# Patient Record
Sex: MALE | Race: WHITE | NOT HISPANIC OR LATINO | Employment: FULL TIME | URBAN - METROPOLITAN AREA
[De-identification: names, ages, dates, MRNs, and addresses within clinical notes are randomized per-mention and may not be internally consistent; named-entity substitution may affect disease eponyms.]

---

## 2018-02-20 ENCOUNTER — OFFICE VISIT (OUTPATIENT)
Dept: INTERNAL MEDICINE CLINIC | Facility: CLINIC | Age: 23
End: 2018-02-20
Payer: COMMERCIAL

## 2018-02-20 VITALS
SYSTOLIC BLOOD PRESSURE: 124 MMHG | OXYGEN SATURATION: 98 % | TEMPERATURE: 98.2 F | BODY MASS INDEX: 31.61 KG/M2 | DIASTOLIC BLOOD PRESSURE: 82 MMHG | HEART RATE: 94 BPM | HEIGHT: 70 IN | WEIGHT: 220.8 LBS

## 2018-02-20 DIAGNOSIS — Z13.1 ENCOUNTER FOR SCREENING EXAMINATION FOR IMPAIRED GLUCOSE REGULATION AND DIABETES MELLITUS: ICD-10-CM

## 2018-02-20 DIAGNOSIS — Z77.21 HISTORY OF EXPOSURE TO HAZARDOUS BODILY FLUIDS: ICD-10-CM

## 2018-02-20 DIAGNOSIS — Z13.220 SCREENING, LIPID: Primary | ICD-10-CM

## 2018-02-20 PROCEDURE — 99213 OFFICE O/P EST LOW 20 MIN: CPT | Performed by: PHYSICIAN ASSISTANT

## 2018-02-20 NOTE — PROGRESS NOTES
Assessment/Plan:    Screening, lipid   No recent cholesterol screening  Due to age will check lipids with ordered panel as discussed below  Encouraged low-fat low-cholesterol diet which antibody can benefit from    History of exposure to hazardous bodily fluids   Previous sexual exposure with a partner that may have HSV 2  Patient himself does get intermittent cold sores but has for some time and was unrelated to this previous exposure  Has since recently started with new partner wanted to be tested  Will send for STI panel  It should be noted he does have a history of previous cold sores and I noted to him he may come back positive for IgG  Discussed prophylaxis medications which she wants to hold on at this time  Discussed the importance of safe sex practices and condom use to prevent the risk of STD  Discussed that a hsv can be transmitted even when cold sores or genital lesions are not present  Patient currently without any symptoms or lesions  Encounter for screening examination for impaired glucose regulation and diabetes mellitus    Will check screening fasting CMP to evaluate glucose and renal function       Diagnoses and all orders for this visit:    Screening, lipid  -     Comprehensive metabolic panel; Future  -     Lipid panel; Future    History of exposure to hazardous bodily fluids  -     Chlamydia/GC amplified DNA by PCR; Future  -     Hepatitis B surface antigen; Future  -     Hepatitis C antibody; Future  -     RPR; Future  -     HSV 1/2 Ab (IgM), IFA W/Rfl To Titer; Future  -     HSV Type 1-Specific Ab, IgG; Future  -     HSV Type 2-Specific Ab, IgG; Future    Encounter for screening examination for impaired glucose regulation and diabetes mellitus  -     Comprehensive metabolic panel; Future  -     Lipid panel; Future          Subjective:          Patient ID: Clyde Forte is a 21 y o  male  40-year-old male presents to the office for evaluation    Notes his  Previous partner with whom he was sexually active was H as the 2 positive and should be tested  She was treated with valacyclovir  He himself is without any current symptoms but has since became sexually active with a new partner and he wants to get tested to make sure he is not at a risk to himself or risk of exposing his new partner  Himself never was positive for STD but wants to be tested  Penis Pain   The patient's pertinent negatives include no genital injury, genital itching, genital lesions, penile discharge, penile pain, scrotal swelling or testicular pain  The patient is experiencing no pain  Pertinent negatives include no abdominal pain, chest pain, constipation, coughing, diarrhea, dysuria, fever, frequency, nausea, rash, shortness of breath, sore throat, urgency or vomiting  The following portions of the patient's history were reviewed and updated as appropriate: allergies, current medications, past family history, past medical history, past social history, past surgical history and problem list     Review of Systems   Constitutional: Negative for fever  HENT: Negative for sore throat  Eyes: Negative for visual disturbance  Hx of corneal abrasion some time ago  Full resolved  Respiratory: Negative for cough, shortness of breath and wheezing  Cardiovascular: Negative for chest pain and palpitations  Gastrointestinal: Negative for abdominal pain, blood in stool, constipation, diarrhea, nausea and vomiting  Genitourinary: Negative for discharge, dysuria, frequency, genital sores, penile pain, scrotal swelling, testicular pain and urgency  Musculoskeletal:        Chronic joint muscle aches, works construction  Skin: Negative for rash  Psychiatric/Behavioral: Negative for agitation  History reviewed  No pertinent past medical history  No current outpatient prescriptions on file      No Known Allergies    Social History   Past Surgical History:   Procedure Laterality Date    MOUTH SURGERY Bilateral      Family History   Problem Relation Age of Onset    No Known Problems Mother     No Known Problems Father        Objective:  /82 (BP Location: Left arm, Patient Position: Sitting, Cuff Size: Large)   Pulse 94   Temp 98 2 °F (36 8 °C) (Oral)   Ht 5' 10" (1 778 m)   Wt 100 kg (220 lb 12 8 oz)   SpO2 98%   BMI 31 68 kg/m²   Body mass index is 31 68 kg/m²  Physical Exam   Constitutional: He appears well-developed and well-nourished  No distress  HENT:   Head: Normocephalic and atraumatic  Mouth/Throat: Oropharynx is clear and moist    NO perioral cold sores   Eyes: Pupils are equal, round, and reactive to light  Right eye exhibits no discharge  Left eye exhibits no discharge  No scleral icterus  Neck: Neck supple  Cardiovascular: Normal rate and regular rhythm  Pulmonary/Chest: Effort normal and breath sounds normal  No respiratory distress  He has no wheezes  CTA throughout  Abdominal: Soft  Bowel sounds are normal  There is no tenderness  There is no guarding  Genitourinary: No penile tenderness  Genitourinary Comments: No penis rash, no penile drainage  Musculoskeletal: He exhibits no edema  Neurological: He is alert  No gross focal neuro deficits  Skin: Skin is warm and dry  No rash noted  He is not diaphoretic  No penis drainage  No penile rash  No testicular TTP  No hernia  Psychiatric: He has a normal mood and affect  His behavior is normal  Thought content normal    Nursing note and vitals reviewed

## 2018-02-20 NOTE — PATIENT INSTRUCTIONS
Screening, lipid   No recent cholesterol screening  Due to age will check lipids with ordered panel as discussed below  Encouraged low-fat low-cholesterol diet which antibody can benefit from    History of exposure to hazardous bodily fluids   Previous sexual exposure with a partner that may have HSV 2  Patient himself does get intermittent cold sores but has for some time and was unrelated to this previous exposure  Has since recently started with new partner wanted to be tested  Will send for STI panel  It should be noted he does have a history of previous cold sores and I noted to him he may come back positive for IgG  Discussed prophylaxis medications which she wants to hold on at this time  Discussed the importance of safe sex practices and condom use to prevent the risk of STD  Discussed that a hsv can be transmitted even when cold sores or genital lesions are not present  Patient currently without any symptoms or lesions      Encounter for screening examination for impaired glucose regulation and diabetes mellitus    Will check screening fasting CMP to evaluate glucose and renal function

## 2018-02-20 NOTE — ASSESSMENT & PLAN NOTE
Previous sexual exposure with a partner that may have HSV 2  Patient himself does get intermittent cold sores but has for some time and was unrelated to this previous exposure  Has since recently started with new partner wanted to be tested  Will send for STI panel  It should be noted he does have a history of previous cold sores and I noted to him he may come back positive for IgG  Discussed prophylaxis medications which she wants to hold on at this time  Discussed the importance of safe sex practices and condom use to prevent the risk of STD  Discussed that a hsv can be transmitted even when cold sores or genital lesions are not present  Patient currently without any symptoms or lesions

## 2018-02-20 NOTE — ASSESSMENT & PLAN NOTE
No recent cholesterol screening  Due to age will check lipids with ordered panel as discussed below    Encouraged low-fat low-cholesterol diet which antibody can benefit from

## 2018-03-06 NOTE — PROGRESS NOTES
Could we please check to see that we get HSV results, still pending on this lab    Thanks Tayler Mart

## 2018-03-09 ENCOUNTER — TELEPHONE (OUTPATIENT)
Dept: INTERNAL MEDICINE CLINIC | Facility: CLINIC | Age: 23
End: 2018-03-09

## 2018-03-09 DIAGNOSIS — Z77.21 HISTORY OF EXPOSURE TO HAZARDOUS BODILY FLUIDS: Primary | ICD-10-CM

## 2018-03-09 PROBLEM — R89.4 POSITIVE TEST FOR HERPES SIMPLEX VIRUS (HSV) ANTIBODY: Status: ACTIVE | Noted: 2018-03-09

## 2018-03-12 ENCOUNTER — TELEPHONE (OUTPATIENT)
Dept: INTERNAL MEDICINE CLINIC | Facility: CLINIC | Age: 23
End: 2018-03-12

## 2018-03-12 NOTE — TELEPHONE ENCOUNTER
Called pt  Mailbox is full and not enough room to leave message  Will attempt to call patient again

## 2018-03-13 NOTE — TELEPHONE ENCOUNTER
Informed pt of message  Mailed script for HIV to house per pt request   Phone number was incorrect in chart    Updated demographics

## 2018-04-25 LAB
C TRACH RRNA SPEC QL PROBE: NOT DETECTED
N GONORRHOEA RRNA SPEC QL PROBE: NOT DETECTED

## 2018-10-01 ENCOUNTER — OFFICE VISIT (OUTPATIENT)
Dept: INTERNAL MEDICINE CLINIC | Facility: CLINIC | Age: 23
End: 2018-10-01
Payer: COMMERCIAL

## 2018-10-01 VITALS
DIASTOLIC BLOOD PRESSURE: 72 MMHG | WEIGHT: 220 LBS | RESPIRATION RATE: 18 BRPM | HEIGHT: 70 IN | SYSTOLIC BLOOD PRESSURE: 116 MMHG | HEART RATE: 68 BPM | BODY MASS INDEX: 31.5 KG/M2

## 2018-10-01 DIAGNOSIS — Z20.2 EXPOSURE TO STD: Primary | ICD-10-CM

## 2018-10-01 PROCEDURE — 87389 HIV-1 AG W/HIV-1&-2 AB AG IA: CPT | Performed by: NURSE PRACTITIONER

## 2018-10-01 PROCEDURE — 86696 HERPES SIMPLEX TYPE 2 TEST: CPT | Performed by: NURSE PRACTITIONER

## 2018-10-01 PROCEDURE — 87491 CHLMYD TRACH DNA AMP PROBE: CPT | Performed by: NURSE PRACTITIONER

## 2018-10-01 PROCEDURE — 86695 HERPES SIMPLEX TYPE 1 TEST: CPT | Performed by: NURSE PRACTITIONER

## 2018-10-01 PROCEDURE — 36415 COLL VENOUS BLD VENIPUNCTURE: CPT | Performed by: NURSE PRACTITIONER

## 2018-10-01 PROCEDURE — 87591 N.GONORRHOEAE DNA AMP PROB: CPT | Performed by: NURSE PRACTITIONER

## 2018-10-01 PROCEDURE — 86592 SYPHILIS TEST NON-TREP QUAL: CPT | Performed by: NURSE PRACTITIONER

## 2018-10-01 PROCEDURE — 99213 OFFICE O/P EST LOW 20 MIN: CPT | Performed by: NURSE PRACTITIONER

## 2018-10-01 RX ORDER — AZITHROMYCIN 500 MG/1
1000 TABLET, FILM COATED ORAL ONCE
Qty: 2 TABLET | Refills: 0 | Status: SHIPPED | OUTPATIENT
Start: 2018-10-01 | End: 2018-10-01

## 2018-10-01 RX ORDER — CEFTRIAXONE SODIUM 250 MG/1
250 INJECTION, POWDER, FOR SOLUTION INTRAMUSCULAR; INTRAVENOUS ONCE
Status: COMPLETED | OUTPATIENT
Start: 2018-10-01 | End: 2018-10-01

## 2018-10-01 RX ADMIN — CEFTRIAXONE SODIUM 250 MG: 250 INJECTION, POWDER, FOR SOLUTION INTRAMUSCULAR; INTRAVENOUS at 07:59

## 2018-10-01 NOTE — PATIENT INSTRUCTIONS
Will test for all STDs today  Treated empirically for GC/Chlamydia with IM rocephin and po azithromycin  Will check test of cure in 3 weeks  Patient educated to use condoms until test of cure is clear  Educated about safe-sexual practices   Return for for health maintenance exam

## 2018-10-01 NOTE — PROGRESS NOTES
Assessment/Plan:    No problem-specific Assessment & Plan notes found for this encounter  Diagnoses and all orders for this visit:    Exposure to STD  -     Chlamydia/GC amplified DNA by PCR  -     HIV 1/2 AG-AB combo; Future  -     Herpes I/II IgG Antibodies; Future  -     RPR; Future  -     HIV 1/2 AG-AB combo  -     Herpes I/II IgG Antibodies  -     RPR  -     cefTRIAXone (ROCEPHIN) injection 250 mg; Inject 250 mg into a muscle once   -     azithromycin (ZITHROMAX) 500 MG tablet; Take 2 tablets (1,000 mg total) by mouth once for 1 dose  -     Chlamydia/GC amplified DNA by PCR; Future      Will test for all STDs today  Treated empirically for GC/Chlamydia with IM rocephin and po azithromycin  Will check test of cure in 3 weeks  Patient educated to use condoms until test of cure is clear  Educated about safe-sexual practices  Return for for health maintenance exam      Subjective:      Patient ID: Larry Hurst is a 21 y o  male  Patient presents for an acute visit  He reports that an ex-girlfriend informed him last week that she tested positive for chlamydia  He would like to have a full panel of STD testing  He denies burning on urination, penile discharge, testicular pain, rash, penile lesions, or other symptoms  Exposure to STD   The patient's pertinent negatives include no penile discharge, penile pain, scrotal swelling or testicular pain  Primary symptoms comment: Denies  This is a new problem  Episode onset: Unclear  The patient is experiencing no pain  Pertinent negatives include no abdominal pain, chest pain, chills, constipation, diarrhea, dysuria, fever, flank pain, frequency, headaches, hematuria, hesitancy, nausea, painful intercourse, rash, shortness of breath, sore throat, urgency or vomiting  Nothing aggravates the symptoms  He has tried nothing for the symptoms  He is sexually active  He inconsistently uses condoms   Yes (former partner tested postive for chlamydia), his partner has an STD  There is no history of kidney stones  The following portions of the patient's history were reviewed and updated as appropriate: allergies, current medications, past family history, past medical history, past social history, past surgical history and problem list     Review of Systems   Constitutional: Negative for chills and fever  HENT: Negative for sore throat  Respiratory: Negative for shortness of breath  Cardiovascular: Negative for chest pain  Gastrointestinal: Negative for abdominal pain, constipation, diarrhea, nausea and vomiting  Genitourinary: Negative for decreased urine volume, discharge, dysuria, flank pain, frequency, genital sores, hesitancy, penile pain, penile swelling, scrotal swelling, testicular pain and urgency  Skin: Negative for rash  Neurological: Negative for dizziness and headaches  Psychiatric/Behavioral: The patient is not nervous/anxious  History reviewed  No pertinent past medical history  Current Outpatient Prescriptions:     azithromycin (ZITHROMAX) 500 MG tablet, Take 2 tablets (1,000 mg total) by mouth once for 1 dose, Disp: 2 tablet, Rfl: 0  No current facility-administered medications for this visit  No Known Allergies    Social History   Past Surgical History:   Procedure Laterality Date    MOUTH SURGERY Bilateral      Family History   Problem Relation Age of Onset    No Known Problems Mother     No Known Problems Father        Objective:  /72   Pulse 68   Resp 18   Ht 5' 10" (1 778 m)   Wt 99 8 kg (220 lb)   BMI 31 57 kg/m²        Physical Exam   Constitutional: He is oriented to person, place, and time  He appears well-developed and well-nourished  No distress  HENT:   Head: Normocephalic and atraumatic  Eyes: Pupils are equal, round, and reactive to light  Conjunctivae are normal    Neck: Normal range of motion  Neck supple  No thyromegaly present     Cardiovascular: Normal rate, regular rhythm and normal heart sounds  Pulmonary/Chest: Breath sounds normal  No respiratory distress  Abdominal: Soft  Bowel sounds are normal    Musculoskeletal: Normal range of motion  Neurological: He is alert and oriented to person, place, and time  Skin: Skin is warm and dry  Psychiatric: He has a normal mood and affect   His behavior is normal  Judgment and thought content normal

## 2018-10-02 LAB
CHLAMYDIA DNA CVX QL NAA+PROBE: ABNORMAL
N GONORRHOEA DNA GENITAL QL NAA+PROBE: ABNORMAL
RPR SER QL: NORMAL

## 2018-10-04 ENCOUNTER — TELEPHONE (OUTPATIENT)
Dept: INTERNAL MEDICINE CLINIC | Facility: CLINIC | Age: 23
End: 2018-10-04

## 2018-10-04 LAB — HIV 1+2 AB+HIV1 P24 AG SERPL QL IA: NORMAL

## 2018-10-04 NOTE — TELEPHONE ENCOUNTER
I called and discussed patient's positive Chlamydia test result with him  He said that he did get the ceftriaxone 250 mg IM injection and the 1G of Azithromycin  Pt states that he has no symptoms at this time and I informed him that the medications he took should take care of the infection but that he should call the office if he develops symptoms at any time  Of note, his girlfriend was already treated  He denies being sexually involved with any other partner

## 2018-11-05 ENCOUNTER — OFFICE VISIT (OUTPATIENT)
Dept: INTERNAL MEDICINE CLINIC | Facility: CLINIC | Age: 23
End: 2018-11-05
Payer: COMMERCIAL

## 2018-11-05 VITALS
HEIGHT: 71 IN | BODY MASS INDEX: 29.34 KG/M2 | TEMPERATURE: 97.7 F | OXYGEN SATURATION: 98 % | DIASTOLIC BLOOD PRESSURE: 60 MMHG | SYSTOLIC BLOOD PRESSURE: 102 MMHG | WEIGHT: 209.6 LBS | HEART RATE: 73 BPM

## 2018-11-05 DIAGNOSIS — Z13.220 SCREENING, LIPID: ICD-10-CM

## 2018-11-05 DIAGNOSIS — Z13.1 ENCOUNTER FOR SCREENING EXAMINATION FOR IMPAIRED GLUCOSE REGULATION AND DIABETES MELLITUS: ICD-10-CM

## 2018-11-05 DIAGNOSIS — Z20.2 EXPOSURE TO STD: ICD-10-CM

## 2018-11-05 DIAGNOSIS — Z00.00 HEALTHCARE MAINTENANCE: Primary | ICD-10-CM

## 2018-11-05 LAB
ALBUMIN SERPL BCP-MCNC: 4.3 G/DL (ref 3.5–5)
ALP SERPL-CCNC: 92 U/L (ref 46–116)
ALT SERPL W P-5'-P-CCNC: 57 U/L (ref 12–78)
ANION GAP SERPL CALCULATED.3IONS-SCNC: 6 MMOL/L (ref 4–13)
AST SERPL W P-5'-P-CCNC: 28 U/L (ref 5–45)
BILIRUB SERPL-MCNC: 0.85 MG/DL (ref 0.2–1)
BUN SERPL-MCNC: 17 MG/DL (ref 5–25)
CALCIUM SERPL-MCNC: 9.3 MG/DL (ref 8.3–10.1)
CHLORIDE SERPL-SCNC: 104 MMOL/L (ref 100–108)
CHOLEST SERPL-MCNC: 168 MG/DL (ref 50–200)
CO2 SERPL-SCNC: 28 MMOL/L (ref 21–32)
CREAT SERPL-MCNC: 0.85 MG/DL (ref 0.6–1.3)
GFR SERPL CREATININE-BSD FRML MDRD: 123 ML/MIN/1.73SQ M
GLUCOSE P FAST SERPL-MCNC: 91 MG/DL (ref 65–99)
HDLC SERPL-MCNC: 38 MG/DL (ref 40–60)
LDLC SERPL CALC-MCNC: 107 MG/DL (ref 0–100)
NONHDLC SERPL-MCNC: 130 MG/DL
POTASSIUM SERPL-SCNC: 4.6 MMOL/L (ref 3.5–5.3)
PROT SERPL-MCNC: 7.6 G/DL (ref 6.4–8.2)
SODIUM SERPL-SCNC: 138 MMOL/L (ref 136–145)
TRIGL SERPL-MCNC: 116 MG/DL

## 2018-11-05 PROCEDURE — 87591 N.GONORRHOEAE DNA AMP PROB: CPT | Performed by: NURSE PRACTITIONER

## 2018-11-05 PROCEDURE — 87491 CHLMYD TRACH DNA AMP PROBE: CPT | Performed by: NURSE PRACTITIONER

## 2018-11-05 PROCEDURE — 80053 COMPREHEN METABOLIC PANEL: CPT | Performed by: NURSE PRACTITIONER

## 2018-11-05 PROCEDURE — 36415 COLL VENOUS BLD VENIPUNCTURE: CPT | Performed by: NURSE PRACTITIONER

## 2018-11-05 PROCEDURE — 80061 LIPID PANEL: CPT | Performed by: NURSE PRACTITIONER

## 2018-11-05 PROCEDURE — 99395 PREV VISIT EST AGE 18-39: CPT | Performed by: NURSE PRACTITIONER

## 2018-11-05 NOTE — PROGRESS NOTES
Assessment/Plan:     Diagnoses and all orders for this visit:    Healthcare maintenance      Patient to have previously ordered labs completed today after his visit  Discussed with him discontinuing using chewing tobacco, including the risks of nicotine use  Discussed testicular self examinations and healthy diet  States sexual practices discussed with patient, including barrier methods of contraception  The patient will have a test of cure urine test post treatment for chlamydia today along with his lab work  Patient to follow up in 6 months or sooner as needed  Subjective:      Patient ID: Yvette Salguero is a 21 y o  male and presents today for Health Maintenance Physical     Last Physical: DOT physical 3/27/2018    Pt reports overall health:  Good     Healthy Diet:  Patient sometimes eats fast food, eats at restaurants often  He reports that he primarily eats red meat  Routine Exercise: He works construction daily, 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  [facility] Concerns:  Denies    Problems with vision:  Denies vision problems, although 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   Last Eye Exam:  With DOT physical    Problems with Hearing:  Denies, tries to wear proper hearing protection    Routine Dental Exams:  Goes twice yearly    Smoking History:  Denies smoking, but chews tobacco    ETOH Use:  Drinks a case of beer about every 2 weeks, but this varies  Illegal Drug Use:  Denies  Caffeine Use:  20 oz can of Sugar Free  Red Bull per day  Last PSA:  N/a     Testicular self exam:  Denies     Last Colonoscopy:  Denies  History of abnormal Colonoscopy:  N/a  Family History of Colon CA:  Denies    Last Dexa:  Denies    Immunizations:  Patient reports that he had childhood vaccines  TDAP 9/3/2015  Declines Flu shot    Last Labs:  STD panel within 2018, no previous labs in chart       The following portions of the patient's history were reviewed and updated as appropriate: allergies, current medications, past family history, past medical history, past social history, past surgical history and problem list     Review of Systems   Constitutional: Negative for appetite change, chills, diaphoresis, fatigue, fever and unexpected weight change  HENT: Negative for dental problem, ear pain, hearing loss, tinnitus, trouble swallowing and voice change  Eyes: Negative for pain and visual disturbance  Respiratory: Negative for cough, chest tightness, shortness of breath and wheezing  Cardiovascular: Negative for chest pain, palpitations and leg swelling  Gastrointestinal: Negative for abdominal pain, anal bleeding, blood in stool, constipation, diarrhea, nausea, rectal pain and vomiting  Endocrine: Negative for cold intolerance, heat intolerance, polydipsia, polyphagia and polyuria  Genitourinary: Negative for discharge, dysuria, genital sores, scrotal swelling and testicular pain  Musculoskeletal: Positive for back pain (patient reports that it relates to what type of work he is doing, varies, is not constant, mild)  Negative for gait problem, neck pain and neck stiffness  Skin: Negative for rash and wound  Neurological: Negative for dizziness, seizures, syncope, light-headedness and headaches  Hematological: Does not bruise/bleed easily  Psychiatric/Behavioral: Negative for agitation, confusion, decreased concentration, hallucinations, self-injury, sleep disturbance and suicidal ideas  The patient is not nervous/anxious  History reviewed  No pertinent past medical history  No current outpatient prescriptions on file      No Known Allergies    Social History   Past Surgical History:   Procedure Laterality Date    MOUTH SURGERY Bilateral     WISDOM TOOTH EXTRACTION       Family History   Problem Relation Age of Onset    Areta Rose Mary Parkinson White syndrome Mother     Thyroid disease Mother     No Known Problems Father        Objective:  /60 (BP Location: Left arm, Patient Position: Sitting, Cuff Size: Large)   Pulse 73   Temp 97 7 °F (36 5 °C) (Oral)   Ht 5' 11" (1 803 m)   Wt 95 1 kg (209 lb 9 6 oz)   SpO2 98%   BMI 29 23 kg/m²      Physical Exam   Constitutional: He is oriented to person, place, and time  He appears well-developed and well-nourished  No distress  HENT:   Head: Normocephalic and atraumatic  Right Ear: External ear normal    Left Ear: External ear normal    Nose: Nose normal    Mouth/Throat: Oropharynx is clear and moist  No oropharyngeal exudate  Eyes: Pupils are equal, round, and reactive to light  Conjunctivae and EOM are normal    Neck: Normal range of motion  Neck supple  No tracheal deviation present  No thyromegaly present  Cardiovascular: Normal rate, regular rhythm, normal heart sounds and intact distal pulses  Pulmonary/Chest: Effort normal and breath sounds normal  No respiratory distress  Abdominal: Soft  Bowel sounds are normal  He exhibits no distension and no mass  There is no tenderness  Hernia confirmed negative in the right inguinal area and confirmed negative in the left inguinal area  Genitourinary: Prostate normal, testes normal and penis normal  Cremasteric reflex is present  Circumcised  No penile tenderness  Musculoskeletal: Normal range of motion  He exhibits no edema  Lymphadenopathy:     He has no cervical adenopathy  Right: No inguinal adenopathy present  Left: No inguinal adenopathy present  Neurological: He is alert and oriented to person, place, and time  Skin: Skin is warm and dry  No rash noted  He is not diaphoretic  Psychiatric: He has a normal mood and affect  His behavior is normal  Judgment and thought content normal    Vitals reviewed

## 2018-11-05 NOTE — PATIENT INSTRUCTIONS

## 2018-11-07 LAB
CHLAMYDIA DNA CVX QL NAA+PROBE: ABNORMAL
N GONORRHOEA DNA GENITAL QL NAA+PROBE: ABNORMAL

## 2018-11-12 DIAGNOSIS — A74.9 CHLAMYDIA: Primary | ICD-10-CM

## 2018-11-12 RX ORDER — AZITHROMYCIN 500 MG/1
1000 TABLET, FILM COATED ORAL ONCE
Qty: 2 TABLET | Refills: 0 | Status: SHIPPED | OUTPATIENT
Start: 2018-11-12 | End: 2018-11-12

## 2018-11-12 NOTE — PROGRESS NOTES
Patient's test of cure for chlamydia returned positive  He had been treated with 1 gram of azithromycin and ceftriaxone IM in the office  The patient reports that his partner had been treated and did a resumed sexual activity  The patient was advised to have his partner re-treated, as well  Abstain for 1 week following treatment  Retest for cure in 3 weeks

## 2019-05-06 ENCOUNTER — OFFICE VISIT (OUTPATIENT)
Dept: INTERNAL MEDICINE CLINIC | Facility: CLINIC | Age: 24
End: 2019-05-06
Payer: COMMERCIAL

## 2019-05-06 VITALS
BODY MASS INDEX: 30.67 KG/M2 | DIASTOLIC BLOOD PRESSURE: 80 MMHG | SYSTOLIC BLOOD PRESSURE: 112 MMHG | HEART RATE: 70 BPM | OXYGEN SATURATION: 98 % | WEIGHT: 214.2 LBS | TEMPERATURE: 97.7 F | HEIGHT: 70 IN

## 2019-05-06 DIAGNOSIS — Z20.2 CHLAMYDIA CONTACT, TREATED: Primary | ICD-10-CM

## 2019-05-06 DIAGNOSIS — E66.9 OBESITY (BMI 30-39.9): ICD-10-CM

## 2019-05-06 LAB
C TRACH DNA SPEC QL NAA+PROBE: NEGATIVE
N GONORRHOEA DNA SPEC QL NAA+PROBE: NEGATIVE

## 2019-05-06 PROCEDURE — 99213 OFFICE O/P EST LOW 20 MIN: CPT | Performed by: NURSE PRACTITIONER

## 2019-05-06 PROCEDURE — 1036F TOBACCO NON-USER: CPT | Performed by: NURSE PRACTITIONER

## 2019-05-06 PROCEDURE — 3008F BODY MASS INDEX DOCD: CPT | Performed by: NURSE PRACTITIONER

## 2019-05-06 PROCEDURE — 87491 CHLMYD TRACH DNA AMP PROBE: CPT | Performed by: NURSE PRACTITIONER

## 2019-05-06 PROCEDURE — 87591 N.GONORRHOEAE DNA AMP PROB: CPT | Performed by: NURSE PRACTITIONER

## 2019-05-07 LAB — HSV1 IGG SER IA-ACNC: NORMAL

## 2020-04-16 ENCOUNTER — TELEPHONE (OUTPATIENT)
Dept: INTERNAL MEDICINE CLINIC | Facility: CLINIC | Age: 25
End: 2020-04-16

## 2020-08-13 ENCOUNTER — TELEPHONE (OUTPATIENT)
Dept: INTERNAL MEDICINE CLINIC | Facility: CLINIC | Age: 25
End: 2020-08-13

## 2020-08-13 NOTE — TELEPHONE ENCOUNTER
LMOM to patient to call back the office      Pt is overdue for physical exam  Attending Attestation (For Attendings USE Only)...

## 2020-10-13 ENCOUNTER — OFFICE VISIT (OUTPATIENT)
Dept: INTERNAL MEDICINE CLINIC | Facility: CLINIC | Age: 25
End: 2020-10-13
Payer: COMMERCIAL

## 2020-10-13 VITALS
BODY MASS INDEX: 33.23 KG/M2 | HEART RATE: 89 BPM | HEIGHT: 71 IN | DIASTOLIC BLOOD PRESSURE: 80 MMHG | OXYGEN SATURATION: 98 % | WEIGHT: 237.4 LBS | SYSTOLIC BLOOD PRESSURE: 130 MMHG | TEMPERATURE: 98.4 F

## 2020-10-13 DIAGNOSIS — Z13.220 SCREENING, LIPID: ICD-10-CM

## 2020-10-13 DIAGNOSIS — Z28.21 REFUSED INFLUENZA VACCINE: ICD-10-CM

## 2020-10-13 DIAGNOSIS — Z00.00 PHYSICAL EXAM, ANNUAL: Primary | ICD-10-CM

## 2020-10-13 PROCEDURE — 3725F SCREEN DEPRESSION PERFORMED: CPT | Performed by: NURSE PRACTITIONER

## 2020-10-13 PROCEDURE — 1036F TOBACCO NON-USER: CPT | Performed by: NURSE PRACTITIONER

## 2020-10-13 PROCEDURE — 99395 PREV VISIT EST AGE 18-39: CPT | Performed by: NURSE PRACTITIONER

## 2021-03-19 ENCOUNTER — OFFICE VISIT (OUTPATIENT)
Dept: URGENT CARE | Age: 26
End: 2021-03-19
Payer: COMMERCIAL

## 2021-03-19 VITALS
OXYGEN SATURATION: 99 % | WEIGHT: 210 LBS | TEMPERATURE: 96.6 F | RESPIRATION RATE: 16 BRPM | BODY MASS INDEX: 29.4 KG/M2 | HEIGHT: 71 IN | HEART RATE: 72 BPM

## 2021-03-19 DIAGNOSIS — B34.9 ACUTE VIRAL SYNDROME: Primary | ICD-10-CM

## 2021-03-19 PROCEDURE — 99213 OFFICE O/P EST LOW 20 MIN: CPT | Performed by: PHYSICIAN ASSISTANT

## 2021-03-19 PROCEDURE — U0005 INFEC AGEN DETEC AMPLI PROBE: HCPCS | Performed by: PHYSICIAN ASSISTANT

## 2021-03-19 PROCEDURE — U0003 INFECTIOUS AGENT DETECTION BY NUCLEIC ACID (DNA OR RNA); SEVERE ACUTE RESPIRATORY SYNDROME CORONAVIRUS 2 (SARS-COV-2) (CORONAVIRUS DISEASE [COVID-19]), AMPLIFIED PROBE TECHNIQUE, MAKING USE OF HIGH THROUGHPUT TECHNOLOGIES AS DESCRIBED BY CMS-2020-01-R: HCPCS | Performed by: PHYSICIAN ASSISTANT

## 2021-03-19 NOTE — PATIENT INSTRUCTIONS
Monitor your symptoms, if symptoms worsen report to the ED  Increase oral hydration  Get plenty of rest   Take Motrin and Tylenol to reduce any fever/pain  [address]    Your healthcare provider and/or public health staff have evaluated you and have determined that you do not need to remain in the hospital at this time  At this time you can be isolated at home where you will be monitored by staff from your local or state health department  You should carefully follow the prevention and isolation steps below until a healthcare provider or local or state health department says that you can return to your normal activities  Stay home except to get medical care    People who are mildly ill with COVID-19 are able to isolate at home during their illness  You should restrict activities outside your home, except for getting medical care  Do not go to work, school, or public areas  Avoid using public transportation, ride-sharing, or taxis  Separate yourself from other people and animals in your home    People: As much as possible, you should stay in a specific room and away from other people in your home  Also, you should use a separate bathroom, if available  Animals: You should restrict contact with pets and other animals while you are sick with COVID-19, just like you would around other people  Although there have not been reports of pets or other animals becoming sick with COVID-19, it is still recommended that people sick with COVID-19 limit contact with animals until more information is known about the virus  When possible, have another member of your household care for your animals while you are sick  If you are sick with COVID-19, avoid contact with your pet, including petting, snuggling, being kissed or licked, and sharing food  If you must care for your pet or be around animals while you are sick, wash your hands before and after you interact with pets and wear a facemask   See COVID-19 and Animals for more information  Call ahead before visiting your doctor    If you have a medical appointment, call the healthcare provider and tell them that you have or may have COVID-19  This will help the healthcare providers office take steps to keep other people from getting infected or exposed  Wear a facemask    You should wear a facemask when you are around other people (e g , sharing a room or vehicle) or pets and before you enter a healthcare providers office  If you are not able to wear a facemask (for example, because it causes trouble breathing), then people who live with you should not stay in the same room with you, or they should wear a facemask if they enter your room  Cover your coughs and sneezes    Cover your mouth and nose with a tissue when you cough or sneeze  Throw used tissues in a lined trash can  Immediately wash your hands with soap and water for at least 20 seconds or, if soap and water are not available, clean your hands with an alcohol-based hand  that contains at least 60% alcohol  Clean your hands often    Wash your hands often with soap and water for at least 20 seconds, especially after blowing your nose, coughing, or sneezing; going to the bathroom; and before eating or preparing food  If soap and water are not readily available, use an alcohol-based hand  with at least 60% alcohol, covering all surfaces of your hands and rubbing them together until they feel dry  Soap and water are the best option if hands are visibly dirty  Avoid touching your eyes, nose, and mouth with unwashed hands  Avoid sharing personal household items    You should not share dishes, drinking glasses, cups, eating utensils, towels, or bedding with other people or pets in your home  After using these items, they should be washed thoroughly with soap and water      Clean all high-touch surfaces everyday    High touch surfaces include counters, tabletops, doorknobs, bathroom fixtures, toilets, phones, keyboards, tablets, and bedside tables  Also, clean any surfaces that may have blood, stool, or body fluids on them  Use a household cleaning spray or wipe, according to the label instructions  Labels contain instructions for safe and effective use of the cleaning product including precautions you should take when applying the product, such as wearing gloves and making sure you have good ventilation during use of the product  Monitor your symptoms    Seek prompt medical attention if your illness is worsening (e g , difficulty breathing)  Before seeking care, call your healthcare provider and tell them that you have, or are being evaluated for, COVID-19  Put on a facemask before you enter the facility  These steps will help the healthcare providers office to keep other people in the office or waiting room from getting infected or exposed  Ask your healthcare provider to call the local or [facility] health department  Persons who are placed under active monitoring or facilitated self-monitoring should follow instructions provided by their local health department or occupational health professionals, as appropriate  If you have a medical emergency and need to call 911, notify the dispatch personnel that you have, or are being evaluated for COVID-19  If possible, put on a facemask before emergency medical services arrive      Discontinuing home isolation    Patients with confirmed COVID-19 should remain under home isolation precautions until the following conditions are met:   - They have had no fever for at least 24 hours (that is one full day of no fever without the use medicine that reduces fevers)  AND  - other symptoms have improved (for example, when their cough or shortness of breath have improved)  AND  - If had mild or moderate illness, at least 10 days have passed since their symptoms first appeared or if severe illness (needed oxygen) or immunosuppressed, at least 20 days have passed since symptoms first appeared  Patients with confirmed COVID-19 should also notify close contacts (including their workplace) and ask that they self-quarantine  Currently, close contact is defined as being within 6 feet for 15 minutes or more from the period 24 hours starting 48 hours before symptom onset to the time at which the patient went into isolation  Close contacts of patients diagnosed with COVID-19 should be instructed by the patient to self-quarantine for 14 days from the last time of their last contact with the patient       Source: [employer]

## 2021-03-19 NOTE — LETTER
March 19, 2021     Patient: [place]   YOB: 1995   Date of Visit: 3/19/2021       To Whom It May Concern: It is my medical opinion that [place] Should remain out of work for 10 days since symptom onset, fever free without the use of medications for 24 hours, and overall improvement of symptoms  OR 10 days since last high risk exposure OR negative test results  If you have any questions or concerns, please don't hesitate to call           Sincerely,        Jeannine Loya PA-C    CC: [place]

## 2021-03-19 NOTE — PROGRESS NOTES
[facility]        NAME: Edita Gates is a 32 y o  male  : 1995    MRN: [id number]  DATE: 2021  TIME: 2:58 PM    Assessment and Plan   Acute viral syndrome [B34 9]  1  Acute viral syndrome  Novel Coronavirus (Covid-19),PCR [facility] - Office Collection         Patient Instructions       Follow up with PCP in 3-5 days  Proceed to  ER if symptoms worsen  Chief Complaint     Chief Complaint   Patient presents with    COVID-19     night sweats 3/19         History of Present Illness       Patient is a 25 y/o/m presenting [facility] with chills and night sweats  Pt reports night sweats last evening and hot flashes today  Pt work sent pt here for Covid-19 testing  Pt denies known fever, congestion, cough, sore throat, CP, SOB, N/V/D  Review of Systems   Review of Systems   Constitutional: Positive for chills and diaphoresis  Negative for fever  HENT: Negative for ear pain and sore throat  Eyes: Negative for pain and visual disturbance  Respiratory: Negative for cough and shortness of breath  Cardiovascular: Negative for chest pain and palpitations  Gastrointestinal: Negative for abdominal pain and vomiting  Genitourinary: Negative for dysuria and hematuria  Musculoskeletal: Negative for arthralgias and back pain  Skin: Negative for color change and rash  Neurological: Negative for seizures and syncope  All other systems reviewed and are negative  Current Medications     No current outpatient medications on file  Current Allergies     Allergies as of 2021    (No Known Allergies)            The following portions of the patient's history were reviewed and updated as appropriate: allergies, current medications, past family history, past medical history, past social history, past surgical history and problem list      History reviewed  No pertinent past medical history      Past Surgical History:   Procedure Laterality Date    MOUTH SURGERY Bilateral     WISDOM TOOTH EXTRACTION         Family History   Problem Relation Age of Onset    Sybil Sofya Parkinson White syndrome Mother     Thyroid disease Mother     No Known Problems Father          Medications have been verified  Objective   Pulse 72   Temp (!) 96 6 °F (35 9 °C)   Resp 16   Ht 5' 11" (1 803 m)   Wt 95 3 kg (210 lb)   SpO2 99%   BMI 29 29 kg/m²   No LMP for male patient  Physical Exam     Physical Exam  Constitutional:       Appearance: Normal appearance  He is normal weight  HENT:      Head: Normocephalic and atraumatic  Nose: Nose normal       Mouth/Throat:      Mouth: Mucous membranes are moist    Eyes:      Extraocular Movements: Extraocular movements intact  Conjunctiva/sclera: Conjunctivae normal       Pupils: Pupils are equal, round, and reactive to light  Neck:      Musculoskeletal: Normal range of motion and neck supple  Cardiovascular:      Rate and Rhythm: Normal rate  Pulmonary:      Effort: Pulmonary effort is normal    Musculoskeletal: Normal range of motion  Skin:     General: Skin is warm and dry  Neurological:      General: No focal deficit present  Mental Status: He is alert and oriented to person, place, and time     Psychiatric:         Mood and Affect: Mood normal          Behavior: Behavior normal

## 2021-03-20 LAB — SARS-COV-2 RNA RESP QL NAA+PROBE: NEGATIVE

## 2022-03-29 ENCOUNTER — APPOINTMENT (OUTPATIENT)
Dept: RADIOLOGY | Facility: CLINIC | Age: 27
End: 2022-03-29
Payer: OTHER MISCELLANEOUS

## 2022-03-29 ENCOUNTER — OCCMED (OUTPATIENT)
Dept: URGENT CARE | Facility: CLINIC | Age: 27
End: 2022-03-29
Payer: OTHER MISCELLANEOUS

## 2022-03-29 DIAGNOSIS — S46.911A SHOULDER STRAIN, RIGHT, INITIAL ENCOUNTER: Primary | ICD-10-CM

## 2022-03-29 DIAGNOSIS — S46.911A SHOULDER STRAIN, RIGHT, INITIAL ENCOUNTER: ICD-10-CM

## 2022-03-29 PROCEDURE — 99283 EMERGENCY DEPT VISIT LOW MDM: CPT | Performed by: PHYSICIAN ASSISTANT

## 2022-03-29 PROCEDURE — G0382 LEV 3 HOSP TYPE B ED VISIT: HCPCS | Performed by: PHYSICIAN ASSISTANT

## 2022-03-29 PROCEDURE — 73030 X-RAY EXAM OF SHOULDER: CPT

## 2022-04-01 ENCOUNTER — APPOINTMENT (OUTPATIENT)
Dept: URGENT CARE | Facility: CLINIC | Age: 27
End: 2022-04-01
Payer: OTHER MISCELLANEOUS

## 2022-04-01 PROCEDURE — 99213 OFFICE O/P EST LOW 20 MIN: CPT | Performed by: PHYSICIAN ASSISTANT

## 2022-04-19 ENCOUNTER — OCCMED (OUTPATIENT)
Dept: URGENT CARE | Facility: CLINIC | Age: 27
End: 2022-04-19
Payer: OTHER MISCELLANEOUS

## 2022-04-19 DIAGNOSIS — S42.254D CLOSED NONDISPLACED FRACTURE OF GREATER TUBEROSITY OF RIGHT HUMERUS WITH ROUTINE HEALING, SUBSEQUENT ENCOUNTER: Primary | ICD-10-CM

## 2022-04-19 PROCEDURE — 99213 OFFICE O/P EST LOW 20 MIN: CPT | Performed by: PHYSICIAN ASSISTANT

## 2022-04-19 NOTE — TELEPHONE ENCOUNTER
Reggie Nails from [facility] called to make appointment for patient  Patient was seen at [facility]  Dr Cara Morel   MRI done   Not finalized  Fracture of shoulder    Patient has follow up with Dr Cara mccall at 5:00 pm [phone]  Requesting patient get in to be seen asap     Hello,  Please advise if the following patient can be forced onto the schedule:    Patient: [facility]    : 1995    MRN: [id number]    Call back #: [phone] or [facility] [phone]    Insurance: [facility]  Personal Choice     Reason for appointment: Fx of shoulder  Not finalized   Requested doctor/location: [place] [facility]       Thank you

## 2022-04-21 ENCOUNTER — OFFICE VISIT (OUTPATIENT)
Dept: OBGYN CLINIC | Facility: OTHER | Age: 27
End: 2022-04-21
Payer: OTHER MISCELLANEOUS

## 2022-04-21 VITALS
WEIGHT: 225 LBS | BODY MASS INDEX: 31.5 KG/M2 | HEIGHT: 71 IN | DIASTOLIC BLOOD PRESSURE: 85 MMHG | HEART RATE: 87 BPM | SYSTOLIC BLOOD PRESSURE: 125 MMHG

## 2022-04-21 DIAGNOSIS — S42.254A CLOSED NONDISPLACED FRACTURE OF GREATER TUBEROSITY OF RIGHT HUMERUS, INITIAL ENCOUNTER: Primary | ICD-10-CM

## 2022-04-21 PROCEDURE — 99204 OFFICE O/P NEW MOD 45 MIN: CPT | Performed by: ORTHOPAEDIC SURGERY

## 2022-04-21 NOTE — PROGRESS NOTES
Assessment  Diagnoses and all orders for this visit:    Closed nondisplaced fracture of greater tuberosity of right humerus, initial encounter      Discussion and Plan:  The patient has an examination and MRI consistent with right greater tuberosity fracture that is already 3 weeks out from the injury  I have discussed with the patient the pathophysiology of this diagnosis and reviewed how the examination correlates with this diagnosis  · Patient will continue light duty: no lifting, pushing, or pulling greater than 10 lbs  · Patient will follow up in 4 weeks at which time we will consider revising those restrictions based on his symptomatic improvement and examination with the possibility of a full duty release  It would not be inconsistent with the injury the patient may take up to 3 months or longer to fully recover      Subjective:   Patient ID: Radha Treadwell is a 32 y o  male      HPI  The patient presents with a chief complaint of right shoulder pain  The pain began 3 week(s) ago and is associated with an acute injury  Patient reports a fall at work on 3/29/22  The patient describes the pain as aching and dull in intensity,  intermittent in timing, and localizes the pain to the  right lateral shoulder  The pain is worse with overhead work and raising arm over head and relieved by rest   The pain is not associated with numbness and tingling  The pain is not associated with constitutional symptoms  The patient is awoken at night by the pain  The patient was seen by occupational medicine who ordered a MRI and referred the patient here today for follow up  The following portions of the patient's history were reviewed and updated as appropriate: allergies, current medications, past family history, past medical history, past social history, past surgical history and problem list     Review of Systems   Constitutional: Negative for chills and fever     HENT: Negative for drooling and hearing loss  Eyes: Negative for visual disturbance  Respiratory: Negative for cough and shortness of breath  Cardiovascular: Negative for chest pain  Gastrointestinal: Negative for abdominal pain  Skin: Negative for rash  Psychiatric/Behavioral: Negative for agitation  Objective:  /85   Pulse 87   Ht 5' 11" (1 803 m)   Wt 102 kg (225 lb)   BMI 31 38 kg/m²       Right Shoulder Exam     Tenderness   Right shoulder tenderness location: greater tuberosity  Range of Motion   The patient has normal right shoulder ROM  Muscle Strength   Abduction: 5/5   External rotation: 5/5     Other   Erythema: absent  Sensation: normal  Pulse: present    Comments:    Pain with supraspinatus strength testing          Physical Exam  Vitals reviewed  Constitutional:       Appearance: He is well-developed  HENT:      Head: Normocephalic  Eyes:      Pupils: Pupils are equal, round, and reactive to light  Pulmonary:      Effort: Pulmonary effort is normal    Skin:     General: Skin is warm and dry  I have personally reviewed pertinent films in PACS and my interpretation is as follows  MRI right shoulder demonstrates a non-displaced greater tuberosity fracture      Scribe Attestation    I,:  Beltran Washburn am acting as a scribe while in the presence of the attending physician :       I,:  Ana Ken MD personally performed the services described in this documentation    as scribed in my presence :

## 2022-04-21 NOTE — LETTER
April 21, 2022     Patient: [place]  YOB: 1995  Date of Visit: 4/21/2022      To Whom it May Concern:    [employer] [place] [employer] is under my professional care  Donnie Bryant was seen in my office on 4/21/2022  [employer] [place] may return to work no pushing, pulling, or pulling greater than 10 lbs  If you have any questions or concerns, please don't hesitate to call           Sincerely,          Ck Vickers MD        CC: No Recipients

## 2022-05-10 ENCOUNTER — OFFICE VISIT (OUTPATIENT)
Dept: OBGYN CLINIC | Facility: OTHER | Age: 27
End: 2022-05-10
Payer: OTHER MISCELLANEOUS

## 2022-05-10 VITALS
HEART RATE: 72 BPM | BODY MASS INDEX: 31.5 KG/M2 | WEIGHT: 225 LBS | SYSTOLIC BLOOD PRESSURE: 124 MMHG | HEIGHT: 71 IN | DIASTOLIC BLOOD PRESSURE: 87 MMHG

## 2022-05-10 DIAGNOSIS — S42.254D CLOSED NONDISPLACED FRACTURE OF GREATER TUBEROSITY OF RIGHT HUMERUS WITH ROUTINE HEALING, SUBSEQUENT ENCOUNTER: Primary | ICD-10-CM

## 2022-05-10 PROCEDURE — 99213 OFFICE O/P EST LOW 20 MIN: CPT | Performed by: PHYSICIAN ASSISTANT

## 2022-05-10 NOTE — PROGRESS NOTES
Assessment  Diagnoses and all orders for this visit:    Closed nondisplaced fracture of greater tuberosity of right humerus with routine healing, subsequent encounter      Discussion and Plan:    Ra's right shoulder is healthy on exam   Good motion and strength  No provacative maneuvers  He is eager to return to work without restrictions  I feel comfortable with that given his exam and lack of symptoms    1  All activities to tolerance  2  Return to work - no restrictions  3  Follow up as needed  Subjective:   Patient ID: Mulugeta Chery is a 32 y o  male      [place] presents to the office in follow up of his right shoulder  He is 6 weeks from a greater tuberosity fracture  Injury occurred at work  He is using the right shoulder for ADLs and activities of enjoyment without pain  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  He denies pain with overhead work  He denies new injury or trauma  Denies numbness or tingling  The following portions of the patient's history were reviewed and updated as appropriate: allergies, current medications, past family history, past medical history, past social history, past surgical history and problem list     Review of Systems   Constitutional: Negative for chills and fever  HENT: Negative for hearing loss  Eyes: Negative for visual disturbance  Respiratory: Negative for shortness of breath  Cardiovascular: Negative for chest pain  Gastrointestinal: Negative for abdominal pain  Musculoskeletal:        As reviewed in the HPI   Skin: Negative for rash  Neurological:        As reviewed in the HPI   Psychiatric/Behavioral: Negative for agitation  Objective:  Ht 5' 11" (1 803 m)   Wt 102 kg (225 lb)   BMI 31 38 kg/m²       Right Shoulder Exam     Tenderness   The patient is experiencing no tenderness      Range of Motion   The patient has normal right shoulder ROM     Muscle Strength   External rotation: 5/5   Supraspinatus: 5/5     Tests   Falk test: negative  Impingement: negative    Other   Erythema: absent  Sensation: normal  Pulse: present            Physical Exam  Constitutional:       Appearance: He is well-developed  HENT:      Head: Normocephalic  Pulmonary:      Effort: No respiratory distress  Breath sounds: No wheezing  Musculoskeletal:      Cervical back: Normal range of motion  Skin:     General: Skin is warm and dry  Neurological:      Mental Status: He is alert and oriented to person, place, and time  Psychiatric:         Behavior: Behavior normal          Thought Content:  Thought content normal          Judgment: Judgment normal

## 2022-05-10 NOTE — LETTER
May 10, 2022     Patient: [place]  YOB: 1995  Date of Visit: 5/10/2022      To Whom it May Concern:    [employer] is under my professional care  Donnie Bryant was seen in my office on 5/10/2022  [employer] [place] may return to work on 5/11/2022 without any restrictions with right shoulder  If you have any questions or concerns, please don't hesitate to call           Sincerely,          Eduardo Hernández PA-C        CC: [place]

## 2024-02-21 PROBLEM — Z13.220 SCREENING, LIPID: Status: RESOLVED | Noted: 2018-02-20 | Resolved: 2024-02-21
